# Patient Record
Sex: FEMALE | Race: WHITE | NOT HISPANIC OR LATINO
[De-identification: names, ages, dates, MRNs, and addresses within clinical notes are randomized per-mention and may not be internally consistent; named-entity substitution may affect disease eponyms.]

---

## 2017-03-03 ENCOUNTER — APPOINTMENT (OUTPATIENT)
Dept: OBGYN | Facility: CLINIC | Age: 22
End: 2017-03-03

## 2018-05-10 ENCOUNTER — RESULT REVIEW (OUTPATIENT)
Age: 23
End: 2018-05-10

## 2018-05-10 ENCOUNTER — APPOINTMENT (OUTPATIENT)
Dept: OBGYN | Facility: CLINIC | Age: 23
End: 2018-05-10
Payer: COMMERCIAL

## 2018-05-10 PROCEDURE — 99395 PREV VISIT EST AGE 18-39: CPT

## 2019-05-16 ENCOUNTER — APPOINTMENT (OUTPATIENT)
Dept: OBGYN | Facility: CLINIC | Age: 24
End: 2019-05-16
Payer: COMMERCIAL

## 2019-05-16 PROCEDURE — 99395 PREV VISIT EST AGE 18-39: CPT

## 2019-05-17 ENCOUNTER — RESULT REVIEW (OUTPATIENT)
Age: 24
End: 2019-05-17

## 2020-07-20 ENCOUNTER — FORM ENCOUNTER (OUTPATIENT)
Age: 25
End: 2020-07-20

## 2020-08-09 ENCOUNTER — FORM ENCOUNTER (OUTPATIENT)
Age: 25
End: 2020-08-09

## 2020-08-10 ENCOUNTER — APPOINTMENT (OUTPATIENT)
Dept: OBGYN | Facility: CLINIC | Age: 25
End: 2020-08-10
Payer: COMMERCIAL

## 2020-08-10 ENCOUNTER — RESULT REVIEW (OUTPATIENT)
Age: 25
End: 2020-08-10

## 2020-08-10 PROCEDURE — 99395 PREV VISIT EST AGE 18-39: CPT

## 2021-06-01 ENCOUNTER — FORM ENCOUNTER (OUTPATIENT)
Age: 26
End: 2021-06-01

## 2021-09-01 ENCOUNTER — FORM ENCOUNTER (OUTPATIENT)
Age: 26
End: 2021-09-01

## 2021-09-02 ENCOUNTER — APPOINTMENT (OUTPATIENT)
Dept: OBGYN | Facility: CLINIC | Age: 26
End: 2021-09-02
Payer: COMMERCIAL

## 2021-09-02 PROCEDURE — 99395 PREV VISIT EST AGE 18-39: CPT

## 2021-09-07 ENCOUNTER — FORM ENCOUNTER (OUTPATIENT)
Age: 26
End: 2021-09-07

## 2021-09-16 ENCOUNTER — TRANSCRIPTION ENCOUNTER (OUTPATIENT)
Age: 26
End: 2021-09-16

## 2021-10-04 ENCOUNTER — FORM ENCOUNTER (OUTPATIENT)
Age: 26
End: 2021-10-04

## 2022-10-27 ENCOUNTER — APPOINTMENT (OUTPATIENT)
Dept: OBGYN | Facility: CLINIC | Age: 27
End: 2022-10-27

## 2022-10-27 VITALS
DIASTOLIC BLOOD PRESSURE: 79 MMHG | WEIGHT: 165 LBS | HEIGHT: 66 IN | BODY MASS INDEX: 26.52 KG/M2 | SYSTOLIC BLOOD PRESSURE: 115 MMHG

## 2022-10-27 DIAGNOSIS — Z01.419 ENCOUNTER FOR GYNECOLOGICAL EXAMINATION (GENERAL) (ROUTINE) W/OUT ABNORMAL FINDINGS: ICD-10-CM

## 2022-10-27 PROCEDURE — 99395 PREV VISIT EST AGE 18-39: CPT

## 2022-10-27 RX ORDER — NORGESTIMATE AND ETHINYL ESTRADIOL 0.25-0.035
0.25-35 KIT ORAL DAILY
Qty: 3 | Refills: 3 | Status: ACTIVE | COMMUNITY
Start: 2022-08-03 | End: 1900-01-01

## 2022-10-28 NOTE — END OF VISIT
[FreeTextEntry3] : I, Jenn Sparksoz, acted as a scribe on behalf of Dr. Merced Villalpando on 10/27/2022 .\par \par All medical entries made by the scribe were at my, Dr. Merced Villalpando, direction and personally dictated by me on 10/27/2022. I have reviewed the chart and agree that the record accurately reflects my personal performance of the history, physical exam, assessment and plan. I have also personally directed, reviewed, and agreed with the chart.\par

## 2022-10-28 NOTE — PLAN
[FreeTextEntry1] : Annual exam \par -Pap test collected and sent at today's visit\par - Continue OCP use \par -advised PNV 3 months prior to stopping OCP\par -RTO 1 year for annual\par

## 2022-10-28 NOTE — HISTORY OF PRESENT ILLNESS
[Patient reported PAP Smear was normal] : Patient reported PAP Smear was normal [No] : Patient does not have concerns regarding sex [FreeTextEntry1] : 27 year y/o G0 presents for annual wellness visit. Pt reports she is currently taking OCP. Pt was last seen 09/2021. Pt got  2021. Pt just bought a house in [place]. Pt is considering pregnancy next year. Pt is a speech pathologist working for a [employer] \par \par Negative PAP 08/2020\par \par Pt has changed her name from Matt\par \par Social Hx: Pt denies recreational drug use, tobacco use, and alcohol use. \par \par \par \par  [PapSmeardate] : 08/2020

## 2022-11-04 ENCOUNTER — TRANSCRIPTION ENCOUNTER (OUTPATIENT)
Age: 27
End: 2022-11-04

## 2022-11-04 LAB — CYTOLOGY CVX/VAG DOC THIN PREP: NORMAL

## 2023-10-19 ENCOUNTER — APPOINTMENT (OUTPATIENT)
Dept: OBGYN | Facility: CLINIC | Age: 28
End: 2023-10-19
Payer: COMMERCIAL

## 2023-10-19 VITALS
HEIGHT: 66 IN | DIASTOLIC BLOOD PRESSURE: 78 MMHG | SYSTOLIC BLOOD PRESSURE: 120 MMHG | WEIGHT: 171 LBS | BODY MASS INDEX: 27.48 KG/M2

## 2023-10-19 DIAGNOSIS — Z11.4 ENCOUNTER FOR SCREENING FOR HUMAN IMMUNODEFICIENCY VIRUS [HIV]: ICD-10-CM

## 2023-10-19 DIAGNOSIS — Z11.59 ENCOUNTER FOR SCREENING FOR OTHER VIRAL DISEASES: ICD-10-CM

## 2023-10-19 DIAGNOSIS — Z13.88 ENCOUNTER FOR SCREENING FOR DISORDER DUE TO EXPOSURE TO CONTAMINANTS: ICD-10-CM

## 2023-10-19 DIAGNOSIS — M54.50 LOW BACK PAIN, UNSPECIFIED: ICD-10-CM

## 2023-10-19 DIAGNOSIS — Z32.01 ENCOUNTER FOR PREGNANCY TEST, RESULT POSITIVE: ICD-10-CM

## 2023-10-19 DIAGNOSIS — Z01.84 ENCOUNTER FOR ANTIBODY RESPONSE EXAMINATION: ICD-10-CM

## 2023-10-19 DIAGNOSIS — N39.0 URINARY TRACT INFECTION, SITE NOT SPECIFIED: ICD-10-CM

## 2023-10-19 DIAGNOSIS — N64.4 MASTODYNIA: ICD-10-CM

## 2023-10-19 DIAGNOSIS — Z13.21 ENCOUNTER FOR SCREENING FOR NUTRITIONAL DISORDER: ICD-10-CM

## 2023-10-19 DIAGNOSIS — R11.0 NAUSEA: ICD-10-CM

## 2023-10-19 DIAGNOSIS — D64.9 ANEMIA, UNSPECIFIED: ICD-10-CM

## 2023-10-19 DIAGNOSIS — Z34.01 ENCOUNTER FOR SUPERVISION OF NORMAL FIRST PREGNANCY, FIRST TRIMESTER: ICD-10-CM

## 2023-10-19 DIAGNOSIS — Z13.29 ENCOUNTER FOR SCREENING FOR OTHER SUSPECTED ENDOCRINE DISORDER: ICD-10-CM

## 2023-10-19 DIAGNOSIS — Z01.83 ENCOUNTER FOR BLOOD TYPING: ICD-10-CM

## 2023-10-19 DIAGNOSIS — A74.9 CHLAMYDIAL INFECTION, UNSPECIFIED: ICD-10-CM

## 2023-10-19 DIAGNOSIS — Z11.8 ENCOUNTER FOR SCREENING FOR OTHER INFECTIOUS AND PARASITIC DISEASES: ICD-10-CM

## 2023-10-19 PROCEDURE — 36415 COLL VENOUS BLD VENIPUNCTURE: CPT

## 2023-10-19 PROCEDURE — 99213 OFFICE O/P EST LOW 20 MIN: CPT | Mod: 25

## 2023-10-19 PROCEDURE — G0008: CPT

## 2023-10-19 PROCEDURE — 90686 IIV4 VACC NO PRSV 0.5 ML IM: CPT

## 2023-10-19 PROCEDURE — 76817 TRANSVAGINAL US OBSTETRIC: CPT | Mod: 59

## 2023-10-19 RX ORDER — DOXYLAMINE SUCCINATE AND PYRIDOXINE HYDROCHLORIDE 10; 10 MG/1; MG/1
10-10 TABLET, DELAYED RELEASE ORAL
Qty: 180 | Refills: 2 | Status: ACTIVE | COMMUNITY
Start: 2023-10-19 | End: 1900-01-01

## 2023-10-20 ENCOUNTER — LABORATORY RESULT (OUTPATIENT)
Age: 28
End: 2023-10-20

## 2023-10-21 ENCOUNTER — NON-APPOINTMENT (OUTPATIENT)
Age: 28
End: 2023-10-21

## 2023-10-22 LAB
25(OH)D3 SERPL-MCNC: 26.5 NG/ML
ABO + RH PNL BLD: NORMAL
BACTERIA UR CULT: NORMAL
BASOPHILS # BLD AUTO: 0.05 K/UL
BASOPHILS NFR BLD AUTO: 0.4 %
C TRACH RRNA SPEC QL NAA+PROBE: NOT DETECTED
EOSINOPHIL # BLD AUTO: 0.05 K/UL
EOSINOPHIL NFR BLD AUTO: 0.4 %
HBV SURFACE AG SER QL: NONREACTIVE
HCT VFR BLD CALC: 36.4 %
HCV AB SER QL: NONREACTIVE
HCV S/CO RATIO: 0.11 S/CO
HGB A MFR BLD: 97.3 %
HGB A2 MFR BLD: 2.7 %
HGB BLD-MCNC: 11.2 G/DL
HGB FRACT BLD-IMP: NORMAL
HIV1+2 AB SPEC QL IA.RAPID: NONREACTIVE
IMM GRANULOCYTES NFR BLD AUTO: 0.3 %
LEAD BLD-MCNC: <1 UG/DL
LYMPHOCYTES # BLD AUTO: 2.25 K/UL
LYMPHOCYTES NFR BLD AUTO: 19.1 %
MAN DIFF?: NORMAL
MCHC RBC-ENTMCNC: 30.5 PG
MCHC RBC-ENTMCNC: 30.8 GM/DL
MCV RBC AUTO: 99.2 FL
MEV IGG FLD QL IA: 70.8 AU/ML
MEV IGG+IGM SER-IMP: POSITIVE
MONOCYTES # BLD AUTO: 0.6 K/UL
MONOCYTES NFR BLD AUTO: 5.1 %
MUV AB SER-ACNC: POSITIVE
MUV IGG SER QL IA: 151 AU/ML
N GONORRHOEA RRNA SPEC QL NAA+PROBE: NOT DETECTED
NEUTROPHILS # BLD AUTO: 8.81 K/UL
NEUTROPHILS NFR BLD AUTO: 74.7 %
PLATELET # BLD AUTO: 303 K/UL
RBC # BLD: 3.67 M/UL
RBC # FLD: 12.1 %
RUBV IGG FLD-ACNC: 1.9 INDEX
RUBV IGG SER-IMP: POSITIVE
SOURCE AMPLIFICATION: NORMAL
T PALLIDUM AB SER QL IA: NEGATIVE
TSH SERPL-ACNC: 1.34 UIU/ML
VZV AB TITR SER: POSITIVE
VZV IGG SER IF-ACNC: 689.8 INDEX
WBC # FLD AUTO: 11.8 K/UL

## 2023-10-23 ENCOUNTER — NON-APPOINTMENT (OUTPATIENT)
Age: 28
End: 2023-10-23

## 2023-10-25 LAB
B19V IGG SER QL IA: 6.11 INDEX
B19V IGG+IGM SER-IMP: NORMAL
B19V IGG+IGM SER-IMP: POSITIVE
B19V IGM FLD-ACNC: 0.15 INDEX
B19V IGM SER-ACNC: NEGATIVE

## 2023-10-26 ENCOUNTER — TRANSCRIPTION ENCOUNTER (OUTPATIENT)
Age: 28
End: 2023-10-26

## 2023-10-31 ENCOUNTER — NON-APPOINTMENT (OUTPATIENT)
Age: 28
End: 2023-10-31

## 2023-10-31 ENCOUNTER — TRANSCRIPTION ENCOUNTER (OUTPATIENT)
Age: 28
End: 2023-10-31

## 2023-11-09 ENCOUNTER — APPOINTMENT (OUTPATIENT)
Dept: OBGYN | Facility: CLINIC | Age: 28
End: 2023-11-09
Payer: COMMERCIAL

## 2023-11-09 ENCOUNTER — ASOB RESULT (OUTPATIENT)
Age: 28
End: 2023-11-09

## 2023-11-09 DIAGNOSIS — Z34.81 ENCOUNTER FOR SUPERVISION OF OTHER NORMAL PREGNANCY, FIRST TRIMESTER: ICD-10-CM

## 2023-11-09 PROCEDURE — 0500F INITIAL PRENATAL CARE VISIT: CPT

## 2023-11-09 PROCEDURE — 36415 COLL VENOUS BLD VENIPUNCTURE: CPT

## 2023-11-09 PROCEDURE — 76813 OB US NUCHAL MEAS 1 GEST: CPT

## 2023-11-17 ENCOUNTER — TRANSCRIPTION ENCOUNTER (OUTPATIENT)
Age: 28
End: 2023-11-17

## 2023-12-04 ENCOUNTER — NON-APPOINTMENT (OUTPATIENT)
Age: 28
End: 2023-12-04

## 2023-12-07 ENCOUNTER — APPOINTMENT (OUTPATIENT)
Dept: OBGYN | Facility: CLINIC | Age: 28
End: 2023-12-07
Payer: COMMERCIAL

## 2023-12-07 DIAGNOSIS — Z11.51 ENCOUNTER FOR SCREENING FOR HUMAN PAPILLOMAVIRUS (HPV): ICD-10-CM

## 2023-12-07 DIAGNOSIS — Z34.92 ENCOUNTER FOR SUPERVISION OF NORMAL PREGNANCY, UNSPECIFIED, SECOND TRIMESTER: ICD-10-CM

## 2023-12-07 PROCEDURE — 0502F SUBSEQUENT PRENATAL CARE: CPT

## 2023-12-07 PROCEDURE — 36415 COLL VENOUS BLD VENIPUNCTURE: CPT

## 2023-12-10 ENCOUNTER — TRANSCRIPTION ENCOUNTER (OUTPATIENT)
Age: 28
End: 2023-12-10

## 2023-12-10 LAB
AFP MOM: 0.9
AFP VALUE: 27.5 NG/ML
ALPHA FETOPROTEIN SERUM COMMENT: NORMAL
ALPHA FETOPROTEIN SERUM INTERPRETATION: NORMAL
ALPHA FETOPROTEIN SERUM RESULTS: NORMAL
ALPHA FETOPROTEIN SERUM TEST RESULTS: NORMAL
GESTATIONAL AGE BASED ON: NORMAL
GESTATIONAL AGE ON COLLECTION DATE: 16 WEEKS
INSULIN DEP DIABETES: NO
MATERNAL AGE AT EDD AFP: 31 YR
MULTIPLE GESTATION: NO
OSBR RISK 1 IN: NORMAL
RACE: NORMAL
WEIGHT AFP: 175 LBS

## 2023-12-11 ENCOUNTER — TRANSCRIPTION ENCOUNTER (OUTPATIENT)
Age: 28
End: 2023-12-11

## 2024-01-10 ENCOUNTER — ASOB RESULT (OUTPATIENT)
Age: 29
End: 2024-01-10

## 2024-01-10 ENCOUNTER — APPOINTMENT (OUTPATIENT)
Dept: ANTEPARTUM | Facility: CLINIC | Age: 29
End: 2024-01-10
Payer: COMMERCIAL

## 2024-01-10 PROCEDURE — 76811 OB US DETAILED SNGL FETUS: CPT

## 2024-01-17 ENCOUNTER — NON-APPOINTMENT (OUTPATIENT)
Age: 29
End: 2024-01-17

## 2024-01-17 ENCOUNTER — APPOINTMENT (OUTPATIENT)
Dept: OBGYN | Facility: CLINIC | Age: 29
End: 2024-01-17
Payer: COMMERCIAL

## 2024-01-17 PROCEDURE — 0502F SUBSEQUENT PRENATAL CARE: CPT

## 2024-01-22 ENCOUNTER — NON-APPOINTMENT (OUTPATIENT)
Age: 29
End: 2024-01-22

## 2024-01-22 ENCOUNTER — TRANSCRIPTION ENCOUNTER (OUTPATIENT)
Age: 29
End: 2024-01-22

## 2024-02-20 ENCOUNTER — APPOINTMENT (OUTPATIENT)
Dept: OBGYN | Facility: CLINIC | Age: 29
End: 2024-02-20
Payer: COMMERCIAL

## 2024-02-20 PROCEDURE — 36415 COLL VENOUS BLD VENIPUNCTURE: CPT

## 2024-02-20 PROCEDURE — 0502F SUBSEQUENT PRENATAL CARE: CPT

## 2024-02-21 LAB
25(OH)D3 SERPL-MCNC: 39.6 NG/ML
BASOPHILS # BLD AUTO: 0.04 K/UL
BASOPHILS NFR BLD AUTO: 0.3 %
BLD GP AB SCN SERPL QL: NORMAL
EOSINOPHIL # BLD AUTO: 0.05 K/UL
EOSINOPHIL NFR BLD AUTO: 0.4 %
GLUCOSE 1H P 50 G GLC PO SERPL-MCNC: 130 MG/DL
HCT VFR BLD CALC: 34.3 %
HGB BLD-MCNC: 11.5 G/DL
HIV1+2 AB SPEC QL IA.RAPID: NONREACTIVE
IMM GRANULOCYTES NFR BLD AUTO: 0.5 %
LYMPHOCYTES # BLD AUTO: 1.85 K/UL
LYMPHOCYTES NFR BLD AUTO: 15.7 %
MAN DIFF?: NORMAL
MCHC RBC-ENTMCNC: 30.7 PG
MCHC RBC-ENTMCNC: 33.5 GM/DL
MCV RBC AUTO: 91.7 FL
MONOCYTES # BLD AUTO: 0.48 K/UL
MONOCYTES NFR BLD AUTO: 4.1 %
NEUTROPHILS # BLD AUTO: 9.34 K/UL
NEUTROPHILS NFR BLD AUTO: 79 %
PLATELET # BLD AUTO: 252 K/UL
RBC # BLD: 3.74 M/UL
RBC # FLD: 12.7 %
T PALLIDUM AB SER QL IA: NEGATIVE
WBC # FLD AUTO: 11.82 K/UL

## 2024-02-22 LAB
HCV AB SER QL: NONREACTIVE
HCV S/CO RATIO: 0.16 S/CO

## 2024-03-06 ENCOUNTER — NON-APPOINTMENT (OUTPATIENT)
Age: 29
End: 2024-03-06

## 2024-03-18 ENCOUNTER — APPOINTMENT (OUTPATIENT)
Dept: OBGYN | Facility: CLINIC | Age: 29
End: 2024-03-18
Payer: COMMERCIAL

## 2024-03-18 ENCOUNTER — NON-APPOINTMENT (OUTPATIENT)
Age: 29
End: 2024-03-18

## 2024-03-18 VITALS
SYSTOLIC BLOOD PRESSURE: 108 MMHG | HEIGHT: 66 IN | DIASTOLIC BLOOD PRESSURE: 75 MMHG | BODY MASS INDEX: 31.18 KG/M2 | WEIGHT: 194 LBS

## 2024-03-18 DIAGNOSIS — Z23 ENCOUNTER FOR IMMUNIZATION: ICD-10-CM

## 2024-03-18 PROCEDURE — 90715 TDAP VACCINE 7 YRS/> IM: CPT

## 2024-03-18 PROCEDURE — 0502F SUBSEQUENT PRENATAL CARE: CPT

## 2024-03-18 PROCEDURE — 90471 IMMUNIZATION ADMIN: CPT

## 2024-04-02 ENCOUNTER — NON-APPOINTMENT (OUTPATIENT)
Age: 29
End: 2024-04-02

## 2024-04-02 ENCOUNTER — ASOB RESULT (OUTPATIENT)
Age: 29
End: 2024-04-02

## 2024-04-02 ENCOUNTER — APPOINTMENT (OUTPATIENT)
Dept: OBGYN | Facility: CLINIC | Age: 29
End: 2024-04-02
Payer: COMMERCIAL

## 2024-04-02 PROCEDURE — 76816 OB US FOLLOW-UP PER FETUS: CPT

## 2024-04-02 PROCEDURE — 0502F SUBSEQUENT PRENATAL CARE: CPT

## 2024-04-02 PROCEDURE — 76819 FETAL BIOPHYS PROFIL W/O NST: CPT | Mod: 59

## 2024-04-16 ENCOUNTER — NON-APPOINTMENT (OUTPATIENT)
Age: 29
End: 2024-04-16

## 2024-04-16 ENCOUNTER — APPOINTMENT (OUTPATIENT)
Dept: OBGYN | Facility: CLINIC | Age: 29
End: 2024-04-16
Payer: COMMERCIAL

## 2024-04-16 PROCEDURE — 36415 COLL VENOUS BLD VENIPUNCTURE: CPT

## 2024-04-16 PROCEDURE — 0502F SUBSEQUENT PRENATAL CARE: CPT

## 2024-04-17 LAB
ALBUMIN SERPL ELPH-MCNC: 3.6 G/DL
ALP BLD-CCNC: 114 U/L
ALT SERPL-CCNC: 9 U/L
ANION GAP SERPL CALC-SCNC: 15 MMOL/L
AST SERPL-CCNC: 13 U/L
BILIRUB SERPL-MCNC: 0.4 MG/DL
BUN SERPL-MCNC: 9 MG/DL
CALCIUM SERPL-MCNC: 9.2 MG/DL
CHLORIDE SERPL-SCNC: 102 MMOL/L
CO2 SERPL-SCNC: 22 MMOL/L
CREAT SERPL-MCNC: 0.61 MG/DL
EGFR: 123 ML/MIN/1.73M2
GLUCOSE SERPL-MCNC: 68 MG/DL
POTASSIUM SERPL-SCNC: 4.3 MMOL/L
PROT SERPL-MCNC: 5.9 G/DL
SODIUM SERPL-SCNC: 140 MMOL/L

## 2024-04-18 ENCOUNTER — TRANSCRIPTION ENCOUNTER (OUTPATIENT)
Age: 29
End: 2024-04-18

## 2024-04-18 LAB — BILE AC SER-MCNC: 3.2 UMOL/L

## 2024-04-19 ENCOUNTER — TRANSCRIPTION ENCOUNTER (OUTPATIENT)
Age: 29
End: 2024-04-19

## 2024-04-25 ENCOUNTER — NON-APPOINTMENT (OUTPATIENT)
Age: 29
End: 2024-04-25

## 2024-04-25 ENCOUNTER — APPOINTMENT (OUTPATIENT)
Dept: OBGYN | Facility: CLINIC | Age: 29
End: 2024-04-25
Payer: COMMERCIAL

## 2024-04-25 ENCOUNTER — ASOB RESULT (OUTPATIENT)
Age: 29
End: 2024-04-25

## 2024-04-25 DIAGNOSIS — Z36.85 ENCOUNTER FOR ANTENATAL SCREENING FOR STREPTOCOCCUS B: ICD-10-CM

## 2024-04-25 PROCEDURE — 0502F SUBSEQUENT PRENATAL CARE: CPT

## 2024-04-25 PROCEDURE — 76819 FETAL BIOPHYS PROFIL W/O NST: CPT

## 2024-04-26 ENCOUNTER — TRANSCRIPTION ENCOUNTER (OUTPATIENT)
Age: 29
End: 2024-04-26

## 2024-04-29 ENCOUNTER — TRANSCRIPTION ENCOUNTER (OUTPATIENT)
Age: 29
End: 2024-04-29

## 2024-04-30 ENCOUNTER — NON-APPOINTMENT (OUTPATIENT)
Age: 29
End: 2024-04-30

## 2024-05-01 ENCOUNTER — NON-APPOINTMENT (OUTPATIENT)
Age: 29
End: 2024-05-01

## 2024-05-01 ENCOUNTER — APPOINTMENT (OUTPATIENT)
Dept: OBGYN | Facility: CLINIC | Age: 29
End: 2024-05-01
Payer: COMMERCIAL

## 2024-05-01 PROCEDURE — 0502F SUBSEQUENT PRENATAL CARE: CPT

## 2024-05-08 ENCOUNTER — APPOINTMENT (OUTPATIENT)
Dept: OBGYN | Facility: CLINIC | Age: 29
End: 2024-05-08
Payer: COMMERCIAL

## 2024-05-08 PROCEDURE — 0502F SUBSEQUENT PRENATAL CARE: CPT

## 2024-05-09 ENCOUNTER — NON-APPOINTMENT (OUTPATIENT)
Age: 29
End: 2024-05-09

## 2024-05-09 ENCOUNTER — APPOINTMENT (OUTPATIENT)
Dept: OBGYN | Facility: CLINIC | Age: 29
End: 2024-05-09
Payer: COMMERCIAL

## 2024-05-09 ENCOUNTER — OUTPATIENT (OUTPATIENT)
Dept: OUTPATIENT SERVICES | Facility: HOSPITAL | Age: 29
LOS: 1 days | End: 2024-05-09
Payer: COMMERCIAL

## 2024-05-09 VITALS — OXYGEN SATURATION: 99 % | SYSTOLIC BLOOD PRESSURE: 112 MMHG | HEART RATE: 92 BPM | DIASTOLIC BLOOD PRESSURE: 70 MMHG

## 2024-05-09 VITALS — TEMPERATURE: 98 F

## 2024-05-09 DIAGNOSIS — O26.899 OTHER SPECIFIED PREGNANCY RELATED CONDITIONS, UNSPECIFIED TRIMESTER: ICD-10-CM

## 2024-05-09 LAB
ALBUMIN SERPL ELPH-MCNC: 3.3 G/DL — SIGNIFICANT CHANGE UP (ref 3.3–5)
ALP SERPL-CCNC: 143 U/L — HIGH (ref 40–120)
ALT FLD-CCNC: 10 U/L — SIGNIFICANT CHANGE UP (ref 10–45)
ANION GAP SERPL CALC-SCNC: 14 MMOL/L — SIGNIFICANT CHANGE UP (ref 5–17)
APPEARANCE UR: CLEAR — SIGNIFICANT CHANGE UP
AST SERPL-CCNC: 15 U/L — SIGNIFICANT CHANGE UP (ref 10–40)
BACTERIA # UR AUTO: ABNORMAL /HPF
BASOPHILS # BLD AUTO: 0.03 K/UL — SIGNIFICANT CHANGE UP (ref 0–0.2)
BASOPHILS NFR BLD AUTO: 0.3 % — SIGNIFICANT CHANGE UP (ref 0–2)
BILIRUB SERPL-MCNC: 0.4 MG/DL — SIGNIFICANT CHANGE UP (ref 0.2–1.2)
BILIRUB UR-MCNC: NEGATIVE — SIGNIFICANT CHANGE UP
BUN SERPL-MCNC: 12 MG/DL — SIGNIFICANT CHANGE UP (ref 7–23)
CALCIUM SERPL-MCNC: 9.3 MG/DL — SIGNIFICANT CHANGE UP (ref 8.4–10.5)
CAST: 3 /LPF — SIGNIFICANT CHANGE UP (ref 0–4)
CHLORIDE SERPL-SCNC: 102 MMOL/L — SIGNIFICANT CHANGE UP (ref 96–108)
CO2 SERPL-SCNC: 20 MMOL/L — LOW (ref 22–31)
COLOR SPEC: YELLOW — SIGNIFICANT CHANGE UP
CREAT ?TM UR-MCNC: 76 MG/DL — SIGNIFICANT CHANGE UP
CREAT SERPL-MCNC: 0.54 MG/DL — SIGNIFICANT CHANGE UP (ref 0.5–1.3)
DIFF PNL FLD: NEGATIVE — SIGNIFICANT CHANGE UP
EGFR: 126 ML/MIN/1.73M2 — SIGNIFICANT CHANGE UP
EOSINOPHIL # BLD AUTO: 0.04 K/UL — SIGNIFICANT CHANGE UP (ref 0–0.5)
EOSINOPHIL NFR BLD AUTO: 0.4 % — SIGNIFICANT CHANGE UP (ref 0–6)
GLUCOSE SERPL-MCNC: 88 MG/DL — SIGNIFICANT CHANGE UP (ref 70–99)
GLUCOSE UR QL: NEGATIVE MG/DL — SIGNIFICANT CHANGE UP
HCT VFR BLD CALC: 34 % — LOW (ref 34.5–45)
HGB BLD-MCNC: 11.7 G/DL — SIGNIFICANT CHANGE UP (ref 11.5–15.5)
IMM GRANULOCYTES NFR BLD AUTO: 0.8 % — SIGNIFICANT CHANGE UP (ref 0–0.9)
KETONES UR-MCNC: NEGATIVE MG/DL — SIGNIFICANT CHANGE UP
LDH SERPL L TO P-CCNC: 141 U/L — SIGNIFICANT CHANGE UP (ref 50–242)
LEUKOCYTE ESTERASE UR-ACNC: ABNORMAL
LYMPHOCYTES # BLD AUTO: 1.95 K/UL — SIGNIFICANT CHANGE UP (ref 1–3.3)
LYMPHOCYTES # BLD AUTO: 18.4 % — SIGNIFICANT CHANGE UP (ref 13–44)
MCHC RBC-ENTMCNC: 30.2 PG — SIGNIFICANT CHANGE UP (ref 27–34)
MCHC RBC-ENTMCNC: 34.4 GM/DL — SIGNIFICANT CHANGE UP (ref 32–36)
MCV RBC AUTO: 87.6 FL — SIGNIFICANT CHANGE UP (ref 80–100)
MONOCYTES # BLD AUTO: 0.61 K/UL — SIGNIFICANT CHANGE UP (ref 0–0.9)
MONOCYTES NFR BLD AUTO: 5.7 % — SIGNIFICANT CHANGE UP (ref 2–14)
NEUTROPHILS # BLD AUTO: 7.91 K/UL — HIGH (ref 1.8–7.4)
NEUTROPHILS NFR BLD AUTO: 74.4 % — SIGNIFICANT CHANGE UP (ref 43–77)
NITRITE UR-MCNC: NEGATIVE — SIGNIFICANT CHANGE UP
NRBC # BLD: 0 /100 WBCS — SIGNIFICANT CHANGE UP (ref 0–0)
PH UR: 5.5 — SIGNIFICANT CHANGE UP (ref 5–8)
PLATELET # BLD AUTO: 266 K/UL — SIGNIFICANT CHANGE UP (ref 150–400)
POTASSIUM SERPL-MCNC: 4.1 MMOL/L — SIGNIFICANT CHANGE UP (ref 3.5–5.3)
POTASSIUM SERPL-SCNC: 4.1 MMOL/L — SIGNIFICANT CHANGE UP (ref 3.5–5.3)
PROT ?TM UR-MCNC: 10 MG/DL — SIGNIFICANT CHANGE UP (ref 0–12)
PROT SERPL-MCNC: 6.7 G/DL — SIGNIFICANT CHANGE UP (ref 6–8.3)
PROT UR-MCNC: NEGATIVE MG/DL — SIGNIFICANT CHANGE UP
PROT/CREAT UR-RTO: 0.1 RATIO — SIGNIFICANT CHANGE UP (ref 0–0.2)
RBC # BLD: 3.88 M/UL — SIGNIFICANT CHANGE UP (ref 3.8–5.2)
RBC # FLD: 12.6 % — SIGNIFICANT CHANGE UP (ref 10.3–14.5)
RBC CASTS # UR COMP ASSIST: 2 /HPF — SIGNIFICANT CHANGE UP (ref 0–4)
SODIUM SERPL-SCNC: 136 MMOL/L — SIGNIFICANT CHANGE UP (ref 135–145)
SP GR SPEC: 1.02 — SIGNIFICANT CHANGE UP (ref 1–1.03)
SQUAMOUS # UR AUTO: 10 /HPF — HIGH (ref 0–5)
URATE SERPL-MCNC: 4.9 MG/DL — SIGNIFICANT CHANGE UP (ref 2.5–7)
UROBILINOGEN FLD QL: 1 MG/DL — SIGNIFICANT CHANGE UP (ref 0.2–1)
WBC # BLD: 10.62 K/UL — HIGH (ref 3.8–10.5)
WBC # FLD AUTO: 10.62 K/UL — HIGH (ref 3.8–10.5)
WBC UR QL: 66 /HPF — HIGH (ref 0–5)

## 2024-05-09 PROCEDURE — 96374 THER/PROPH/DIAG INJ IV PUSH: CPT

## 2024-05-09 PROCEDURE — G0463: CPT

## 2024-05-09 PROCEDURE — 59025 FETAL NON-STRESS TEST: CPT | Mod: 26

## 2024-05-09 PROCEDURE — 84550 ASSAY OF BLOOD/URIC ACID: CPT

## 2024-05-09 PROCEDURE — 84156 ASSAY OF PROTEIN URINE: CPT

## 2024-05-09 PROCEDURE — 81001 URINALYSIS AUTO W/SCOPE: CPT

## 2024-05-09 PROCEDURE — 85025 COMPLETE CBC W/AUTO DIFF WBC: CPT

## 2024-05-09 PROCEDURE — 99211 OFF/OP EST MAY X REQ PHY/QHP: CPT

## 2024-05-09 PROCEDURE — 83615 LACTATE (LD) (LDH) ENZYME: CPT

## 2024-05-09 PROCEDURE — 59025 FETAL NON-STRESS TEST: CPT

## 2024-05-09 PROCEDURE — 99221 1ST HOSP IP/OBS SF/LOW 40: CPT | Mod: 25

## 2024-05-09 PROCEDURE — 80053 COMPREHEN METABOLIC PANEL: CPT

## 2024-05-09 PROCEDURE — 82570 ASSAY OF URINE CREATININE: CPT

## 2024-05-09 RX ORDER — ACETAMINOPHEN 500 MG
1000 TABLET ORAL ONCE
Refills: 0 | Status: COMPLETED | OUTPATIENT
Start: 2024-05-09 | End: 2024-05-09

## 2024-05-09 RX ORDER — ACETAMINOPHEN 500 MG
975 TABLET ORAL ONCE
Refills: 0 | Status: DISCONTINUED | OUTPATIENT
Start: 2024-05-09 | End: 2024-05-09

## 2024-05-09 RX ADMIN — Medication 400 MILLIGRAM(S): at 18:05

## 2024-05-09 RX ADMIN — Medication 1000 MILLIGRAM(S): at 18:39

## 2024-05-09 NOTE — OB RN TRIAGE NOTE - SUICIDE SCREENING DEPRESSION
Patient informed of below.  She verbalized her understanding.   Had no further questions or concerns at this time.   Negative

## 2024-05-09 NOTE — OB PROVIDER TRIAGE NOTE - NSHPPHYSICALEXAM_GEN_ALL_CORE
ICU Vital Signs Last 24 Hrs  T(C): --  T(F): --  HR: 79 (09 May 2024 18:00) (73 - 92)  BP: 98/62 (09 May 2024 18:00) (98/62 - 112/70)  BP(mean): --  ABP: --  ABP(mean): --  RR: --  SpO2: 97% (09 May 2024 18:00) (97% - 99%)    gen: A&Ox3  CV: rrr s1s2  abd: gravid soft  VE: deferred  EFM: 135 moderate variability, + acels, -decels  toco: none

## 2024-05-09 NOTE — OB PROVIDER TRIAGE NOTE - NSOBPROVIDERNOTE_OBGYN_ALL_OB_FT
30 y/o  @ 37.6 weeks presenting to L&D to r/o PEC vs. gHTN vs. labile BPs  -HELLP labs  -Tylenol 1000mg IV for +HA  -IV lock    d/w Dr. Rikki Burks NP 28 y/o  @ 37.6 weeks presenting to L&D to r/o PEC vs. gHTN vs. labile BPs  -HELLP labs  -Tylenol 1000mg IV for +HA  -IV lock    d/w Dr. Rikki Burks NP    ---  Addendum 260p  BPs reviewed and normotensive 80-110s/50-70s for the last 2 hours. HELLP labs wnl and P/C 0.1. NST reactive. Pt stable for discharge home. Return precautions reviewed and all questions answered.    D/w Dr. Rikki Tovar, PGY-2

## 2024-05-09 NOTE — OB PROVIDER TRIAGE NOTE - HISTORY OF PRESENT ILLNESS
History and Physical    The patient is a 28 y/o  EDC 2024 @ 37.6 weeks presenting to L&D with elevated BPs today.  The patient had a HA starting today at 11a with nausea x 1 week.  BP with an at home cuff was noted to be 129/106.  Repeated in the OB office and was noted to be 127/84.  The patient denies blurry vision or epigastric pain.  The patient also denies contx, LOF, VB. endorses good fetal movement. The patient accepts all blood products    allergies: nkda  meds: PNV  PNC: uncomplicated    Medhx: pt denies cardiac, pulm, heme, GI, neuro  OBhx: current  Sxhx: pt denies  Gynhx: pt denies cysts, fibroids, abn. pap, STDs  Sochx: pt denies  Famhx: pt denies

## 2024-05-10 DIAGNOSIS — O26.893 OTHER SPECIFIED PREGNANCY RELATED CONDITIONS, THIRD TRIMESTER: ICD-10-CM

## 2024-05-10 DIAGNOSIS — R03.0 ELEVATED BLOOD-PRESSURE READING, WITHOUT DIAGNOSIS OF HYPERTENSION: ICD-10-CM

## 2024-05-10 DIAGNOSIS — R11.0 NAUSEA: ICD-10-CM

## 2024-05-10 DIAGNOSIS — Z3A.37 37 WEEKS GESTATION OF PREGNANCY: ICD-10-CM

## 2024-05-13 ENCOUNTER — ASOB RESULT (OUTPATIENT)
Age: 29
End: 2024-05-13

## 2024-05-13 ENCOUNTER — APPOINTMENT (OUTPATIENT)
Dept: OBGYN | Facility: CLINIC | Age: 29
End: 2024-05-13
Payer: COMMERCIAL

## 2024-05-13 DIAGNOSIS — Z34.03 ENCOUNTER FOR SUPERVISION OF NORMAL FIRST PREGNANCY, THIRD TRIMESTER: ICD-10-CM

## 2024-05-13 PROCEDURE — 76819 FETAL BIOPHYS PROFIL W/O NST: CPT | Mod: 59

## 2024-05-13 PROCEDURE — 76816 OB US FOLLOW-UP PER FETUS: CPT

## 2024-05-13 PROCEDURE — 0502F SUBSEQUENT PRENATAL CARE: CPT

## 2024-05-14 PROBLEM — F41.9 ANXIETY DISORDER, UNSPECIFIED: Chronic | Status: ACTIVE | Noted: 2024-05-09

## 2024-05-14 PROBLEM — J45.909 UNSPECIFIED ASTHMA, UNCOMPLICATED: Chronic | Status: ACTIVE | Noted: 2024-05-09

## 2024-05-19 ENCOUNTER — INPATIENT (INPATIENT)
Facility: HOSPITAL | Age: 29
LOS: 1 days | Discharge: HOME | End: 2024-05-21
Attending: OBSTETRICS & GYNECOLOGY | Admitting: OBSTETRICS & GYNECOLOGY
Payer: COMMERCIAL

## 2024-05-19 VITALS — DIASTOLIC BLOOD PRESSURE: 79 MMHG | SYSTOLIC BLOOD PRESSURE: 119 MMHG | HEART RATE: 75 BPM

## 2024-05-19 DIAGNOSIS — Z34.80 ENCOUNTER FOR SUPERVISION OF OTHER NORMAL PREGNANCY, UNSPECIFIED TRIMESTER: ICD-10-CM

## 2024-05-19 DIAGNOSIS — O26.899 OTHER SPECIFIED PREGNANCY RELATED CONDITIONS, UNSPECIFIED TRIMESTER: ICD-10-CM

## 2024-05-19 DIAGNOSIS — Z34.90 ENCOUNTER FOR SUPERVISION OF NORMAL PREGNANCY, UNSPECIFIED, UNSPECIFIED TRIMESTER: ICD-10-CM

## 2024-05-19 LAB
BASOPHILS # BLD AUTO: 0.04 K/UL — SIGNIFICANT CHANGE UP (ref 0–0.2)
BASOPHILS NFR BLD AUTO: 0.3 % — SIGNIFICANT CHANGE UP (ref 0–2)
BLD GP AB SCN SERPL QL: NEGATIVE — SIGNIFICANT CHANGE UP
EOSINOPHIL # BLD AUTO: 0.02 K/UL — SIGNIFICANT CHANGE UP (ref 0–0.5)
EOSINOPHIL NFR BLD AUTO: 0.1 % — SIGNIFICANT CHANGE UP (ref 0–6)
HCT VFR BLD CALC: 36.6 % — SIGNIFICANT CHANGE UP (ref 34.5–45)
HGB BLD-MCNC: 12.4 G/DL — SIGNIFICANT CHANGE UP (ref 11.5–15.5)
IMM GRANULOCYTES NFR BLD AUTO: 1.3 % — HIGH (ref 0–0.9)
LYMPHOCYTES # BLD AUTO: 0.99 K/UL — LOW (ref 1–3.3)
LYMPHOCYTES # BLD AUTO: 6.8 % — LOW (ref 13–44)
MCHC RBC-ENTMCNC: 29.7 PG — SIGNIFICANT CHANGE UP (ref 27–34)
MCHC RBC-ENTMCNC: 33.9 GM/DL — SIGNIFICANT CHANGE UP (ref 32–36)
MCV RBC AUTO: 87.6 FL — SIGNIFICANT CHANGE UP (ref 80–100)
MONOCYTES # BLD AUTO: 0.61 K/UL — SIGNIFICANT CHANGE UP (ref 0–0.9)
MONOCYTES NFR BLD AUTO: 4.2 % — SIGNIFICANT CHANGE UP (ref 2–14)
NEUTROPHILS # BLD AUTO: 12.61 K/UL — HIGH (ref 1.8–7.4)
NEUTROPHILS NFR BLD AUTO: 87.3 % — HIGH (ref 43–77)
NRBC # BLD: 0 /100 WBCS — SIGNIFICANT CHANGE UP (ref 0–0)
PLATELET # BLD AUTO: 258 K/UL — SIGNIFICANT CHANGE UP (ref 150–400)
RBC # BLD: 4.18 M/UL — SIGNIFICANT CHANGE UP (ref 3.8–5.2)
RBC # FLD: 12.8 % — SIGNIFICANT CHANGE UP (ref 10.3–14.5)
RH IG SCN BLD-IMP: POSITIVE — SIGNIFICANT CHANGE UP
WBC # BLD: 14.46 K/UL — HIGH (ref 3.8–10.5)
WBC # FLD AUTO: 14.46 K/UL — HIGH (ref 3.8–10.5)

## 2024-05-19 PROCEDURE — 59400 OBSTETRICAL CARE: CPT

## 2024-05-19 RX ORDER — OXYTOCIN 10 UNIT/ML
41.67 VIAL (ML) INJECTION
Qty: 20 | Refills: 0 | Status: DISCONTINUED | OUTPATIENT
Start: 2024-05-19 | End: 2024-05-21

## 2024-05-19 RX ORDER — AMPICILLIN TRIHYDRATE 250 MG
2 CAPSULE ORAL ONCE
Refills: 0 | Status: COMPLETED | OUTPATIENT
Start: 2024-05-19 | End: 2024-05-19

## 2024-05-19 RX ORDER — TETANUS TOXOID, REDUCED DIPHTHERIA TOXOID AND ACELLULAR PERTUSSIS VACCINE, ADSORBED 5; 2.5; 8; 8; 2.5 [IU]/.5ML; [IU]/.5ML; UG/.5ML; UG/.5ML; UG/.5ML
0.5 SUSPENSION INTRAMUSCULAR ONCE
Refills: 0 | Status: DISCONTINUED | OUTPATIENT
Start: 2024-05-19 | End: 2024-05-21

## 2024-05-19 RX ORDER — MAGNESIUM HYDROXIDE 400 MG/1
30 TABLET, CHEWABLE ORAL
Refills: 0 | Status: DISCONTINUED | OUTPATIENT
Start: 2024-05-19 | End: 2024-05-21

## 2024-05-19 RX ORDER — CITRIC ACID/SODIUM CITRATE 300-500 MG
15 SOLUTION, ORAL ORAL EVERY 6 HOURS
Refills: 0 | Status: DISCONTINUED | OUTPATIENT
Start: 2024-05-19 | End: 2024-05-20

## 2024-05-19 RX ORDER — SODIUM CHLORIDE 9 MG/ML
1000 INJECTION, SOLUTION INTRAVENOUS
Refills: 0 | Status: DISCONTINUED | OUTPATIENT
Start: 2024-05-19 | End: 2024-05-20

## 2024-05-19 RX ORDER — AMPICILLIN TRIHYDRATE 250 MG
1 CAPSULE ORAL EVERY 4 HOURS
Refills: 0 | Status: DISCONTINUED | OUTPATIENT
Start: 2024-05-19 | End: 2024-05-20

## 2024-05-19 RX ORDER — OXYCODONE HYDROCHLORIDE 5 MG/1
5 TABLET ORAL ONCE
Refills: 0 | Status: DISCONTINUED | OUTPATIENT
Start: 2024-05-19 | End: 2024-05-21

## 2024-05-19 RX ORDER — PRAMOXINE HYDROCHLORIDE 150 MG/15G
1 AEROSOL, FOAM RECTAL EVERY 4 HOURS
Refills: 0 | Status: DISCONTINUED | OUTPATIENT
Start: 2024-05-19 | End: 2024-05-21

## 2024-05-19 RX ORDER — OXYTOCIN 10 UNIT/ML
333.33 VIAL (ML) INJECTION
Qty: 20 | Refills: 0 | Status: DISCONTINUED | OUTPATIENT
Start: 2024-05-19 | End: 2024-05-21

## 2024-05-19 RX ORDER — IBUPROFEN 200 MG
600 TABLET ORAL EVERY 6 HOURS
Refills: 0 | Status: COMPLETED | OUTPATIENT
Start: 2024-05-19 | End: 2025-04-17

## 2024-05-19 RX ORDER — OXYCODONE HYDROCHLORIDE 5 MG/1
5 TABLET ORAL
Refills: 0 | Status: DISCONTINUED | OUTPATIENT
Start: 2024-05-19 | End: 2024-05-21

## 2024-05-19 RX ORDER — ACETAMINOPHEN 500 MG
975 TABLET ORAL
Refills: 0 | Status: DISCONTINUED | OUTPATIENT
Start: 2024-05-19 | End: 2024-05-21

## 2024-05-19 RX ORDER — KETOROLAC TROMETHAMINE 30 MG/ML
30 SYRINGE (ML) INJECTION ONCE
Refills: 0 | Status: DISCONTINUED | OUTPATIENT
Start: 2024-05-19 | End: 2024-05-19

## 2024-05-19 RX ORDER — OXYTOCIN 10 UNIT/ML
4 VIAL (ML) INJECTION
Qty: 30 | Refills: 0 | Status: DISCONTINUED | OUTPATIENT
Start: 2024-05-19 | End: 2024-05-20

## 2024-05-19 RX ORDER — DIBUCAINE 1 %
1 OINTMENT (GRAM) RECTAL EVERY 6 HOURS
Refills: 0 | Status: DISCONTINUED | OUTPATIENT
Start: 2024-05-19 | End: 2024-05-21

## 2024-05-19 RX ORDER — BENZOCAINE 10 %
1 GEL (GRAM) MUCOUS MEMBRANE EVERY 6 HOURS
Refills: 0 | Status: DISCONTINUED | OUTPATIENT
Start: 2024-05-19 | End: 2024-05-21

## 2024-05-19 RX ORDER — SODIUM CHLORIDE 9 MG/ML
3 INJECTION INTRAMUSCULAR; INTRAVENOUS; SUBCUTANEOUS EVERY 8 HOURS
Refills: 0 | Status: DISCONTINUED | OUTPATIENT
Start: 2024-05-19 | End: 2024-05-20

## 2024-05-19 RX ORDER — OXYTOCIN 10 UNIT/ML
10 VIAL (ML) INJECTION ONCE
Refills: 0 | Status: COMPLETED | OUTPATIENT
Start: 2024-05-19 | End: 2024-05-19

## 2024-05-19 RX ORDER — SIMETHICONE 80 MG/1
80 TABLET, CHEWABLE ORAL EVERY 4 HOURS
Refills: 0 | Status: DISCONTINUED | OUTPATIENT
Start: 2024-05-19 | End: 2024-05-21

## 2024-05-19 RX ORDER — DIPHENHYDRAMINE HCL 50 MG
25 CAPSULE ORAL EVERY 6 HOURS
Refills: 0 | Status: DISCONTINUED | OUTPATIENT
Start: 2024-05-19 | End: 2024-05-21

## 2024-05-19 RX ORDER — HYDROCORTISONE 1 %
1 OINTMENT (GRAM) TOPICAL EVERY 6 HOURS
Refills: 0 | Status: DISCONTINUED | OUTPATIENT
Start: 2024-05-19 | End: 2024-05-21

## 2024-05-19 RX ORDER — AER TRAVELER 0.5 G/1
1 SOLUTION RECTAL; TOPICAL EVERY 4 HOURS
Refills: 0 | Status: DISCONTINUED | OUTPATIENT
Start: 2024-05-19 | End: 2024-05-21

## 2024-05-19 RX ORDER — CHLORHEXIDINE GLUCONATE 213 G/1000ML
1 SOLUTION TOPICAL DAILY
Refills: 0 | Status: DISCONTINUED | OUTPATIENT
Start: 2024-05-19 | End: 2024-05-20

## 2024-05-19 RX ORDER — LANOLIN
1 OINTMENT (GRAM) TOPICAL EVERY 6 HOURS
Refills: 0 | Status: DISCONTINUED | OUTPATIENT
Start: 2024-05-19 | End: 2024-05-21

## 2024-05-19 RX ADMIN — SODIUM CHLORIDE 125 MILLILITER(S): 9 INJECTION, SOLUTION INTRAVENOUS at 11:44

## 2024-05-19 RX ADMIN — Medication 30 MILLIGRAM(S): at 21:36

## 2024-05-19 RX ADMIN — Medication 4 MILLIUNIT(S)/MIN: at 16:50

## 2024-05-19 RX ADMIN — Medication 10 UNIT(S): at 20:50

## 2024-05-19 RX ADMIN — Medication 108 GRAM(S): at 15:45

## 2024-05-19 RX ADMIN — Medication 200 GRAM(S): at 11:45

## 2024-05-19 RX ADMIN — Medication 30 MILLIGRAM(S): at 22:36

## 2024-05-19 RX ADMIN — SODIUM CHLORIDE 125 MILLILITER(S): 9 INJECTION, SOLUTION INTRAVENOUS at 15:45

## 2024-05-19 RX ADMIN — Medication 108 GRAM(S): at 19:46

## 2024-05-19 NOTE — OB RN TRIAGE NOTE - FALL HARM RISK - UNIVERSAL INTERVENTIONS
Bed in lowest position, wheels locked, appropriate side rails in place/Call bell, personal items and telephone in reach/Instruct patient to call for assistance before getting out of bed or chair/Non-slip footwear when patient is out of bed/[place] to call system/Physically safe environment - no spills, clutter or unnecessary equipment/Purposeful Proactive Rounding/Room/bathroom lighting operational, light cord in reach

## 2024-05-19 NOTE — PRE-ANESTHESIA EVALUATION ADULT - NSANTHPMHFT_GEN_ALL_CORE
uncomplicated pregnancy presenting for labor epidural. Patient denies back pain, hx bleeding, anesthesia complications.

## 2024-05-19 NOTE — OB RN TRIAGE NOTE - COMFORT/ACCEPTABLE PAIN LEVEL (0-10)
2 [Yes] : Yes [Monthly or less (1 pt)] : Monthly or less (1 point) [1 or 2 (0 pts)] : 1 or 2 (0 points) [Never (0 pts)] : Never (0 points) [No] : In the past 12 months have you used drugs other than those required for medical reasons? No [No falls in past year] : Patient reported no falls in the past year [0] : 2) Feeling down, depressed, or hopeless: Not at all (0) [PHQ-2 Negative - No further assessment needed] : PHQ-2 Negative - No further assessment needed [Audit-CScore] : 1 [[id number]] : 0 [Never] : Never

## 2024-05-19 NOTE — OB PROVIDER H&P - PROBLEM SELECTOR PLAN 1
29 year old  at 39.2 weeks in early labor; +GBS    -Admit to L and D  -Routine labs  -NPO/IVF  -Bictra  -EFM/toco  -Anesthesia consult  -Expectant management  -AMP for GBS prophylaxis  -Anticipate

## 2024-05-19 NOTE — OB PROVIDER DELIVERY SUMMARY - NSSELHIDDEN_OBGYN_ALL_OB_FT
[NS_DeliveryAttending1_OBGYN_ALL_OB_FT:[id number]==],[NS_DeliveryAssist1_OBGYN_ALL_OB_FT:[id number]]

## 2024-05-19 NOTE — OB PROVIDER DELIVERY SUMMARY - NSPROVIDERDELIVERYNOTE_OBGYN_ALL_OB_FT
Spontaneous vaginal delivery of liveborn infant from RODRIGO position. Head, shoulders, and body delivered easily. Nuchalx1, easily reduced. Infant was suctioned. Terminal mec. Delayed cord clamping and infant was passed to mother. Cord clamped and cut. Placenta delivered intact with a 3 vessel cord. Fundal massage was given and uterine fundus was found to be firm. Bimanual sweepx1 done, uterus found to be empty. Vaginal exam revealed an intact cervix, vaginal walls and sulci. Patient had a 1st degree laceration in the perineum that was repaired with 2.0 vicryl suture. Excellent hemostasis was noted. Patient was stable and went to recovery. Count was correct x 2.    Dr. Lindo present at delivery  Alexandra Soares PGY1 Spontaneous vaginal delivery of liveborn infant from RODRIGO position. Head, shoulders, and body delivered easily. Nuchalx1, easily reduced. Infant was suctioned. Terminal mec. Delayed cord clamping and infant was passed to mother. Cord clamped and cut. Infant passed to peds team. Placenta delivered intact with a 3 vessel cord. Fundal massage was given and uterine fundus was found to be firm. Bimanual sweepx1 done, uterus found to be empty. Vaginal exam revealed an intact cervix, vaginal walls and sulci. Patient had a 1st degree laceration in the perineum that was repaired with 2.0 vicryl suture. Excellent hemostasis was noted. Patient was stable. Count was correct x 2.    Pediatrics team present at delivery  Dr. Lindo present at delivery  Alexandra Soares PGY1

## 2024-05-19 NOTE — OB RN DELIVERY SUMMARY - NS_SEPSISRSKCALC_OBGYN_ALL_OB_FT
EOS calculated successfully. EOS Risk Factor: 0.5/1000 live births ([facility] national incidence); GA=39w2d; Temp=98.06; ROM=4.167; GBS='Positive'; Antibiotics='Broad spectrum antibiotics 2-3.9 hrs prior to birth'

## 2024-05-19 NOTE — OB RN PATIENT PROFILE - FALL HARM RISK - UNIVERSAL INTERVENTIONS
- - - Bed in lowest position, wheels locked, appropriate side rails in place/Call bell, personal items and telephone in reach/Instruct patient to call for assistance before getting out of bed or chair/Non-slip footwear when patient is out of bed/[place] to call system/Physically safe environment - no spills, clutter or unnecessary equipment/Purposeful Proactive Rounding/Room/bathroom lighting operational, light cord in reach

## 2024-05-19 NOTE — OB PROVIDER H&P - NSLOWPPHRISK_OBGYN_A_OB
No previous uterine incision/[place] Pregnancy/Less than or equal to 4 previous vaginal births/No known bleeding disorder/No history of postpartum hemorrhage/No other PPH risks indicated

## 2024-05-19 NOTE — OB RN PATIENT PROFILE - FUNCTIONAL ASSESSMENT - BASIC MOBILITY ASSESSMENT TYPE
[Auscultation Breath Sounds / Voice Sounds] : lungs were clear to auscultation bilaterally [Heart Rate And Rhythm] : heart rate was normal and rhythm regular [Heart Sounds] : normal S1 and S2 [Heart Sounds Gallop] : no gallops [Murmurs] : no murmurs [Heart Sounds Pericardial Friction Rub] : no pericardial rub [Examination Of The Chest] : the chest was normal in appearance [Chest Visual Inspection Thoracic Asymmetry] : no chest asymmetry [Diminished Respiratory Excursion] : normal chest expansion [Bowel Sounds] : normal bowel sounds [Abdomen Soft] : soft [Abdomen Tenderness] : non-tender [Skin Color & Pigmentation] : normal skin color and pigmentation [Abdomen Mass (___ Cm)] : no abdominal mass palpated [Skin Turgor] : normal skin turgor [] : no rash [Deep Tendon Reflexes (DTR)] : deep tendon reflexes were 2+ and symmetric [Sensation] : the sensory exam was normal to light touch and pinprick [No Focal Deficits] : no focal deficits [Oriented To Time, Place, And Person] : oriented to person, place, and time [Impaired Insight] : insight and judgment were intact [Affect] : the affect was normal Admission

## 2024-05-19 NOTE — OB RN PATIENT PROFILE - PRO PRENATAL LABS ORI SOURCE HBSAG
Patients  called and was wondering about the patients lab results.  He is not on her BH verbal. I did tell him we will have to give her a call back    hard copy, drawn during this pregnancy

## 2024-05-19 NOTE — OB PROVIDER H&P - ASSESSMENT
29 year old  at 39.2 weeks in early labor; +GBS    -Admit to L and D  -Routine labs  -NPO/IVF  -Bictra  -EFM/toco  -Anesthesia consult  -Expectant management  -AMP for GBS prophylaxis  -Anticipate     d/w MD Guicho Velasquez [facility]

## 2024-05-19 NOTE — OB RN DELIVERY SUMMARY - NSSELHIDDEN_OBGYN_ALL_OB_FT
[NS_DeliveryAttending1_OBGYN_ALL_OB_FT:[id number]==],[NS_DeliveryAssist1_OBGYN_ALL_OB_FT:[id number]],[NS_DeliveryRN_OBGYN_ALL_OB_FT:[id number]]

## 2024-05-19 NOTE — OB PROVIDER LABOR PROGRESS NOTE - NS_SUBJECTIVE/OBJECTIVE_OBGYN_ALL_OB_FT
Pt seen for AROM, light MEC; epidural in place
Pt seen for cervical exam per attending; pt considering epidural
Comfortable with epidural  Bloody show

## 2024-05-19 NOTE — OB RN PATIENT PROFILE - AS TEMP SITE
oral 49M, Tamazight-speaking, originally from [place] [facility] [place] [facility], single, employed washing dishes at a restaurant, domiciled with four roommates including his brother, per psyckes a hx of etoh use disorders, cannabis use disorder, substance induced psychotic d/o, anxiety and major depressive disorder; one prior psychiatric hospitalization at [facility] in 2020 with diagnoses of Alcohol Induced Psychotic d/o; no known prior suicidality/aggression/legal/medical hx; bib ems, activated by pt's boss when he reported he felt lightheaded and fainted at work. Psych consult called by intial ed provider who stated pt reported CAH to kill himself. Transferred to [facility] for CAH/psychosis.     PLAN:   Will continue current management--Abilify 5mg PO daily for psychosis.   PRN Haldol 2mg PO/IM Q6H and Ativan 1mg PO/IM for agitation, PRN Trazodone 50mg PO QHS for insomnia. Patient encouraged to remain treatment adherent.  Continue CO as patient was unresponsive to evaluation today.

## 2024-05-19 NOTE — OB PROVIDER H&P - NS ATTEND AMEND GEN_ALL_CORE FT
I saw and evaluated Ms. Coronado and agree with above.   Prenatal chart was reviewed.   I signed consents for management of labor and delivery, vaginal delivery, vacuum delivery and  delivery and reviewed risks and benefits.   All questions were answered to the best of my ability.   Rebecca CHANDRA

## 2024-05-19 NOTE — OB PROVIDER LABOR PROGRESS NOTE - ASSESSMENT
A/P:  -EFM: resuscitative measures prn  -anesthesia consult for epidural  -anticipate     d/w Dr. Lindo
A/P:  -EFM: resuscitative measures prn  -continue ampicillin ordered  -augment with pitocin  -anticipate     d/w Dr. Lindo
29 year old  at 39.2 weeks in labor  +GBS on ampicillin  on oxytocin  ruptured light mec  peanut ball  recheck with rectal pressure  fetal status is reassuring  Rebecca CHANDRA

## 2024-05-19 NOTE — OB PROVIDER H&P - HISTORY OF PRESENT ILLNESS
29 year old  at 39.2 weeks presents with contractions q6min since 6am. -LOF, -VB, +FM. Uncomplicated pregnancy. +GBS    ObHx: primigravida  MedHx: denies   SurgHx: denies   GynHx: denies fibroids, cysts, abnormal paps, STIs  SocialHx: denies ETOH, tobacco, drug use  PsychHx: h/o situational anxiety; denies depression  All: NKDA, NKFA, NKEA  Meds: PNV    Vital Signs Last 24 Hrs  T(C): 36.6 (19 May 2024 09:18), Max: 36.6 (19 May 2024 09:18)  T(F): 97.9 (19 May 2024 09:18), Max: 97.9 (19 May 2024 09:18)  HR: 81 (19 May 2024 10:34) (72 - 88)  BP: 119/79 (19 May 2024 09:18) (119/79 - 119/79)  BP(mean): --  RR: 18 (19 May 2024 09:18) (18 - 18)  SpO2: 97% (19 May 2024 10:34) (95% - 100%)    PE: NAD, AOx3, abdomen soft gravid  VE: 3.5/70/-3  EFM: 135, moderate variability, +accels, -decels  [place]: 4-6min  EFW: 3600 grams

## 2024-05-20 LAB — T PALLIDUM AB TITR SER: NEGATIVE — SIGNIFICANT CHANGE UP

## 2024-05-20 RX ORDER — IBUPROFEN 200 MG
600 TABLET ORAL EVERY 6 HOURS
Refills: 0 | Status: DISCONTINUED | OUTPATIENT
Start: 2024-05-20 | End: 2024-05-21

## 2024-05-20 RX ADMIN — Medication 1 TABLET(S): at 11:24

## 2024-05-20 RX ADMIN — Medication 975 MILLIGRAM(S): at 17:51

## 2024-05-20 RX ADMIN — Medication 600 MILLIGRAM(S): at 20:50

## 2024-05-20 RX ADMIN — Medication 600 MILLIGRAM(S): at 08:42

## 2024-05-20 RX ADMIN — Medication 600 MILLIGRAM(S): at 03:39

## 2024-05-20 RX ADMIN — Medication 600 MILLIGRAM(S): at 04:39

## 2024-05-20 RX ADMIN — Medication 600 MILLIGRAM(S): at 15:00

## 2024-05-20 RX ADMIN — Medication 975 MILLIGRAM(S): at 11:24

## 2024-05-20 RX ADMIN — Medication 600 MILLIGRAM(S): at 20:23

## 2024-05-20 RX ADMIN — Medication 600 MILLIGRAM(S): at 14:14

## 2024-05-20 RX ADMIN — Medication 600 MILLIGRAM(S): at 09:15

## 2024-05-20 RX ADMIN — Medication 975 MILLIGRAM(S): at 23:49

## 2024-05-20 RX ADMIN — Medication 975 MILLIGRAM(S): at 01:17

## 2024-05-20 RX ADMIN — Medication 975 MILLIGRAM(S): at 12:00

## 2024-05-20 RX ADMIN — Medication 975 MILLIGRAM(S): at 17:08

## 2024-05-20 RX ADMIN — Medication 975 MILLIGRAM(S): at 02:17

## 2024-05-20 NOTE — PROGRESS NOTE ADULT - ASSESSMENT
A/P:  29y  PPD # 1      S/P             doing well  Current Issues: none  PAST MEDICAL & SURGICAL HISTORY:  History of vertigo      Anxiety      Asthma      No Past Surgical History

## 2024-05-21 ENCOUNTER — APPOINTMENT (OUTPATIENT)
Dept: OBGYN | Facility: CLINIC | Age: 29
End: 2024-05-21

## 2024-05-21 ENCOUNTER — TRANSCRIPTION ENCOUNTER (OUTPATIENT)
Age: 29
End: 2024-05-21

## 2024-05-21 VITALS
SYSTOLIC BLOOD PRESSURE: 109 MMHG | DIASTOLIC BLOOD PRESSURE: 75 MMHG | HEART RATE: 77 BPM | OXYGEN SATURATION: 97 % | TEMPERATURE: 98 F

## 2024-05-21 PROCEDURE — 59050 FETAL MONITOR W/REPORT: CPT

## 2024-05-21 PROCEDURE — 86901 BLOOD TYPING SEROLOGIC RH(D): CPT

## 2024-05-21 PROCEDURE — 86850 RBC ANTIBODY SCREEN: CPT

## 2024-05-21 PROCEDURE — 85025 COMPLETE CBC W/AUTO DIFF WBC: CPT

## 2024-05-21 PROCEDURE — 86780 TREPONEMA PALLIDUM: CPT

## 2024-05-21 PROCEDURE — 86900 BLOOD TYPING SEROLOGIC ABO: CPT

## 2024-05-21 PROCEDURE — 36415 COLL VENOUS BLD VENIPUNCTURE: CPT

## 2024-05-21 RX ORDER — IBUPROFEN 200 MG
1 TABLET ORAL
Qty: 0 | Refills: 0 | DISCHARGE
Start: 2024-05-21

## 2024-05-21 RX ORDER — DIBUCAINE 1 %
1 OINTMENT (GRAM) RECTAL
Qty: 0 | Refills: 0 | DISCHARGE
Start: 2024-05-21

## 2024-05-21 RX ORDER — BENZOCAINE 10 %
1 GEL (GRAM) MUCOUS MEMBRANE
Qty: 0 | Refills: 0 | DISCHARGE
Start: 2024-05-21

## 2024-05-21 RX ORDER — LANOLIN
1 OINTMENT (GRAM) TOPICAL
Qty: 0 | Refills: 0 | DISCHARGE
Start: 2024-05-21

## 2024-05-21 RX ORDER — ACETAMINOPHEN 500 MG
2 TABLET ORAL
Qty: 0 | Refills: 0 | DISCHARGE
Start: 2024-05-21

## 2024-05-21 RX ADMIN — Medication 975 MILLIGRAM(S): at 05:38

## 2024-05-21 RX ADMIN — Medication 975 MILLIGRAM(S): at 00:20

## 2024-05-21 RX ADMIN — Medication 975 MILLIGRAM(S): at 06:10

## 2024-05-21 RX ADMIN — Medication 600 MILLIGRAM(S): at 08:38

## 2024-05-21 RX ADMIN — Medication 600 MILLIGRAM(S): at 09:30

## 2024-05-21 RX ADMIN — Medication 975 MILLIGRAM(S): at 11:40

## 2024-05-21 NOTE — DISCHARGE NOTE OB - PATIENT PORTAL LINK FT
You can access the FollowMyHealth Patient Portal offered by [facility] by registering at the following website: [email or web address]. By joining [facility]’s FollowMyHealth portal, you will also be able to view your health information using other applications (apps) compatible with our system.

## 2024-05-21 NOTE — PROGRESS NOTE ADULT - SUBJECTIVE AND OBJECTIVE BOX
Postpartum Note- PPD#2    Allergies    No Known Allergies    Intolerances    Blood Type O   Positive    RPR : Negative    Rubella: Immune    S: Patient is a  30yo            PPD#2         S/P     Patient w/o complaints, pain is controlled.    Pt is OOB, tolerating PO, passing flatus. Lochia WNL.     O:  Vital Signs Last 24 Hrs  T(C): 36.6 (20 May 2024 06:04), Max: 36.8 (19 May 2024 22:59)  T(F): 97.8 (20 May 2024 06:04), Max: 98.2 (19 May 2024 22:59)  HR: 66 (20 May 2024 06:04) (61 - 123)  BP: 107/72 (20 May 2024 06:04) (81/53 - 129/80)  RR: 18 (20 May 2024 06:04) (17 - 18)  SpO2: 99% (20 May 2024 06:04) (84% - 100%)       Gen: NAD  Abdomen: Soft, nontender, non-distended, fundus firm.  Vaginal: Lochia WNL  Ext: Neg calf tenderness    LABS:    Hemoglobin: 12.4 g/dL ( @ 12:13)      Hematocrit: 36.6 % (- @ 12:13)              
Postpartum Note- PPD#1    Allergies    No Known Allergies    Intolerances    Blood Type O   Positive    RPR : Negative    Rubella: Immune    S: Patient is a  28yo            PPD#1         S/P     Patient w/o complaints, pain is controlled.    Pt is OOB, tolerating PO, passing flatus. Lochia WNL.     O:  Vital Signs Last 24 Hrs  T(C): 36.6 (20 May 2024 06:04), Max: 36.8 (19 May 2024 22:59)  T(F): 97.8 (20 May 2024 06:04), Max: 98.2 (19 May 2024 22:59)  HR: 66 (20 May 2024 06:04) (61 - 123)  BP: 107/72 (20 May 2024 06:04) (81/53 - 129/80)  RR: 18 (20 May 2024 06:04) (17 - 18)  SpO2: 99% (20 May 2024 06:04) (84% - 100%)       Gen: NAD  Abdomen: Soft, nontender, non-distended, fundus firm.  Vaginal: Lochia WNL  Ext: Neg calf tenderness    LABS:    Hemoglobin: 12.4 g/dL ( @ 12:13)      Hematocrit: 36.6 % (- @ 12:13)

## 2024-05-21 NOTE — DISCHARGE NOTE OB - NS OB DC MMR REASON NOT RECEIVED
Both phone numbers still busy tone  Unable to leave message  Will attempt again later  Contraindicated

## 2024-05-21 NOTE — PROGRESS NOTE ADULT - ASSESSMENT
A/P:  29y  PPD # 2      S/P             doing well  Current Issues: none  PAST MEDICAL & SURGICAL HISTORY:  History of vertigo      Anxiety      Asthma      No Past Surgical History

## 2024-05-21 NOTE — PROGRESS NOTE ADULT - ATTENDING COMMENTS
pt seen and examined. agree with above.  PPD #1, s/p , overalldoing well and working on PP goals  VSS  FF and below umb  pain controlled with po pain meds prn.  ambulation encouraged.  baby well,  bottle feeding  anticipate d/c home tomorrow
pt seen and examined. agree with above.  PPD #1, s/p , overalldoing well and working on PP goals  VSS  FF and below umb  pain controlled with po pain meds prn.  ambulation encouraged.  baby well,  bottle feeding  anticipate d/c home tomorrow

## 2024-05-21 NOTE — DISCHARGE NOTE OB - MEDICATION SUMMARY - MEDICATIONS TO TAKE
I will START or STAY ON the medications listed below when I get home from the hospital:    ibuprofen 600 mg oral tablet  -- 1 tab(s) by mouth every 6 hours  -- Indication: For pain    acetaminophen 325 mg oral tablet  -- 2 tab(s) by mouth 4 times a day  -- Indication: For pain    benzocaine 20% topical spray  -- 1 Apply on skin to affected area every 6 hours As needed for Perineal discomfort  -- Indication: For perineal discomfort    dibucaine 1% topical ointment  -- 1 Apply on skin to affected area every 6 hours As needed Perineal discomfort  -- Indication: For perineal discomfort    lanolin topical ointment  -- 1 Apply on skin to affected area every 6 hours As needed nipple soreness  -- Indication: For nipple soreness    Prenatal Multivitamins oral tablet  -- orally  -- Indication: For pnv

## 2024-05-21 NOTE — PROGRESS NOTE ADULT - PROBLEM SELECTOR PLAN 1
Increase OOB  Regular diet  PO Pain protocol  Routine Postpartum Care  D/c planning    ALYSA Salinas MD
Increase OOB  Regular diet  PO Pain protocol  Routine Postpartum Care      Keshia Goldman PA-C

## 2024-05-21 NOTE — DISCHARGE NOTE OB - CARE PLAN
Principal Discharge DX:	Vaginal delivery  Assessment and plan of treatment:	pelvic rest x 6 weeks   1

## 2024-05-21 NOTE — DISCHARGE NOTE OB - CARE PROVIDER_API CALL
Reji Salinas  Obstetrics and Gynecology  [address] [place] [address], [facility] [address]  [place] [address]  Phone: [phone]  Fax: [phone]  Follow Up Time:

## 2024-07-08 DIAGNOSIS — A74.9 CHLAMYDIAL INFECTION, UNSPECIFIED: ICD-10-CM

## 2024-07-08 DIAGNOSIS — Z32.01 ENCOUNTER FOR PREGNANCY TEST, RESULT POSITIVE: ICD-10-CM

## 2024-07-08 DIAGNOSIS — Z34.92 ENCOUNTER FOR SUPERVISION OF NORMAL PREGNANCY, UNSPECIFIED, SECOND TRIMESTER: ICD-10-CM

## 2024-07-08 DIAGNOSIS — Z34.03 ENCOUNTER FOR SUPERVISION OF NORMAL FIRST PREGNANCY, THIRD TRIMESTER: ICD-10-CM

## 2024-07-08 DIAGNOSIS — Z34.81 ENCOUNTER FOR SUPERVISION OF OTHER NORMAL PREGNANCY, FIRST TRIMESTER: ICD-10-CM

## 2024-07-08 DIAGNOSIS — Z34.01 ENCOUNTER FOR SUPERVISION OF NORMAL FIRST PREGNANCY, FIRST TRIMESTER: ICD-10-CM

## 2024-07-10 ENCOUNTER — APPOINTMENT (OUTPATIENT)
Dept: OBGYN | Facility: CLINIC | Age: 29
End: 2024-07-10
Payer: COMMERCIAL

## 2024-07-10 VITALS
SYSTOLIC BLOOD PRESSURE: 110 MMHG | DIASTOLIC BLOOD PRESSURE: 70 MMHG | WEIGHT: 188 LBS | BODY MASS INDEX: 30.22 KG/M2 | HEIGHT: 66 IN

## 2024-07-10 DIAGNOSIS — Z30.9 ENCOUNTER FOR CONTRACEPTIVE MANAGEMENT, UNSPECIFIED: ICD-10-CM

## 2024-07-10 DIAGNOSIS — M62.89 OTHER SPECIFIED DISORDERS OF MUSCLE: ICD-10-CM

## 2024-07-10 PROCEDURE — 0503F POSTPARTUM CARE VISIT: CPT

## 2025-04-17 ENCOUNTER — NON-APPOINTMENT (OUTPATIENT)
Age: 30
End: 2025-04-17

## 2025-04-18 ENCOUNTER — APPOINTMENT (OUTPATIENT)
Dept: OBGYN | Facility: CLINIC | Age: 30
End: 2025-04-18
Payer: COMMERCIAL

## 2025-04-18 VITALS
SYSTOLIC BLOOD PRESSURE: 105 MMHG | BODY MASS INDEX: 28.12 KG/M2 | WEIGHT: 175 LBS | DIASTOLIC BLOOD PRESSURE: 73 MMHG | HEIGHT: 66 IN

## 2025-04-18 DIAGNOSIS — Z01.419 ENCOUNTER FOR GYNECOLOGICAL EXAMINATION (GENERAL) (ROUTINE) W/OUT ABNORMAL FINDINGS: ICD-10-CM

## 2025-04-18 PROCEDURE — 99395 PREV VISIT EST AGE 18-39: CPT

## 2025-04-21 LAB — HPV HIGH+LOW RISK DNA PNL CVX: NOT DETECTED

## 2025-04-24 LAB — CYTOLOGY CVX/VAG DOC THIN PREP: NORMAL
